# Patient Record
Sex: MALE | Race: WHITE | NOT HISPANIC OR LATINO | URBAN - METROPOLITAN AREA
[De-identification: names, ages, dates, MRNs, and addresses within clinical notes are randomized per-mention and may not be internally consistent; named-entity substitution may affect disease eponyms.]

---

## 2018-10-24 ENCOUNTER — OUTPATIENT (OUTPATIENT)
Dept: OUTPATIENT SERVICES | Facility: HOSPITAL | Age: 28
LOS: 1 days | End: 2018-10-24
Payer: MEDICARE

## 2018-10-24 PROCEDURE — 87177 OVA AND PARASITES SMEARS: CPT

## 2018-10-24 PROCEDURE — 87045 FECES CULTURE AEROBIC BACT: CPT

## 2018-10-24 PROCEDURE — 87324 CLOSTRIDIUM AG IA: CPT

## 2018-10-24 PROCEDURE — 87046 STOOL CULTR AEROBIC BACT EA: CPT

## 2018-10-25 DIAGNOSIS — R19.7 DIARRHEA, UNSPECIFIED: ICD-10-CM

## 2018-10-26 LAB
CULTURE RESULTS: SIGNIFICANT CHANGE UP
CULTURE RESULTS: SIGNIFICANT CHANGE UP
SPECIMEN SOURCE: SIGNIFICANT CHANGE UP
SPECIMEN SOURCE: SIGNIFICANT CHANGE UP

## 2018-11-06 LAB — CULTURE RESULTS: SIGNIFICANT CHANGE UP

## 2021-04-08 ENCOUNTER — EMERGENCY (EMERGENCY)
Facility: HOSPITAL | Age: 31
LOS: 1 days | Discharge: LEFT BEFORE DISCHARGE | End: 2021-04-08
Attending: EMERGENCY MEDICINE | Admitting: EMERGENCY MEDICINE
Payer: MEDICARE

## 2021-04-08 VITALS
HEART RATE: 120 BPM | SYSTOLIC BLOOD PRESSURE: 126 MMHG | OXYGEN SATURATION: 96 % | HEIGHT: 69 IN | DIASTOLIC BLOOD PRESSURE: 72 MMHG | TEMPERATURE: 99 F | RESPIRATION RATE: 20 BRPM | WEIGHT: 195.11 LBS
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
## 2021-04-08 DIAGNOSIS — R07.9 CHEST PAIN, UNSPECIFIED: ICD-10-CM
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
## 2021-04-08 DIAGNOSIS — R07.2 PRECORDIAL PAIN: ICD-10-CM
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
## 2021-04-08 DIAGNOSIS — Z88.0 ALLERGY STATUS TO PENICILLIN: ICD-10-CM

## 2021-04-08 DIAGNOSIS — F41.9 ANXIETY DISORDER, UNSPECIFIED: ICD-10-CM

## 2021-04-08 PROCEDURE — 99285 EMERGENCY DEPT VISIT HI MDM: CPT | Mod: 25

## 2021-04-08 PROCEDURE — 93010 ELECTROCARDIOGRAM REPORT: CPT

## 2021-04-08 NOTE — ED ADULT TRIAGE NOTE - CHIEF COMPLAINT QUOTE
Pt reports mid sternal CP x 3 days, described as sharp and intermittent. Pt reports pain worse with deep breaths. Hx of pericarditis 1 year ago, but states this feels different. Pt denies SOB/dizziness/cough.

## 2021-04-08 NOTE — ED ADULT TRIAGE NOTE - NS ED TRIAGE AVPU SCALE
Alert-The patient is alert, awake and responds to voice. The patient is oriented to time, place, and person. The triage nurse is able to obtain subjective information. Vascular stents/Clips

## 2021-04-09 VITALS — HEART RATE: 114 BPM

## 2021-04-09 LAB
ALBUMIN SERPL ELPH-MCNC: 4.7 G/DL — SIGNIFICANT CHANGE UP (ref 3.3–5)
ALP SERPL-CCNC: 116 U/L — SIGNIFICANT CHANGE UP (ref 40–120)
ALT FLD-CCNC: 23 U/L — SIGNIFICANT CHANGE UP (ref 10–45)
ANION GAP SERPL CALC-SCNC: 13 MMOL/L — SIGNIFICANT CHANGE UP (ref 5–17)
APTT BLD: 37.5 SEC — HIGH (ref 27.5–35.5)
AST SERPL-CCNC: 18 U/L — SIGNIFICANT CHANGE UP (ref 10–40)
BASOPHILS # BLD AUTO: 0.06 K/UL — SIGNIFICANT CHANGE UP (ref 0–0.2)
BASOPHILS NFR BLD AUTO: 0.8 % — SIGNIFICANT CHANGE UP (ref 0–2)
BILIRUB SERPL-MCNC: <0.2 MG/DL — SIGNIFICANT CHANGE UP (ref 0.2–1.2)
BUN SERPL-MCNC: 18 MG/DL — SIGNIFICANT CHANGE UP (ref 7–23)
CALCIUM SERPL-MCNC: 9.3 MG/DL — SIGNIFICANT CHANGE UP (ref 8.4–10.5)
CHLORIDE SERPL-SCNC: 102 MMOL/L — SIGNIFICANT CHANGE UP (ref 96–108)
CO2 SERPL-SCNC: 25 MMOL/L — SIGNIFICANT CHANGE UP (ref 22–31)
CREAT SERPL-MCNC: 0.77 MG/DL — SIGNIFICANT CHANGE UP (ref 0.5–1.3)
EOSINOPHIL # BLD AUTO: 0.24 K/UL — SIGNIFICANT CHANGE UP (ref 0–0.5)
EOSINOPHIL NFR BLD AUTO: 3.2 % — SIGNIFICANT CHANGE UP (ref 0–6)
GLUCOSE SERPL-MCNC: 119 MG/DL — HIGH (ref 70–99)
HCT VFR BLD CALC: 42.3 % — SIGNIFICANT CHANGE UP (ref 39–50)
HGB BLD-MCNC: 14 G/DL — SIGNIFICANT CHANGE UP (ref 13–17)
IMM GRANULOCYTES NFR BLD AUTO: 0.3 % — SIGNIFICANT CHANGE UP (ref 0–1.5)
INR BLD: 1.11 — SIGNIFICANT CHANGE UP (ref 0.88–1.16)
LIDOCAIN IGE QN: 25 U/L — SIGNIFICANT CHANGE UP (ref 7–60)
LYMPHOCYTES # BLD AUTO: 2.06 K/UL — SIGNIFICANT CHANGE UP (ref 1–3.3)
LYMPHOCYTES # BLD AUTO: 27.1 % — SIGNIFICANT CHANGE UP (ref 13–44)
MCHC RBC-ENTMCNC: 29.5 PG — SIGNIFICANT CHANGE UP (ref 27–34)
MCHC RBC-ENTMCNC: 33.1 GM/DL — SIGNIFICANT CHANGE UP (ref 32–36)
MCV RBC AUTO: 89.2 FL — SIGNIFICANT CHANGE UP (ref 80–100)
MONOCYTES # BLD AUTO: 0.52 K/UL — SIGNIFICANT CHANGE UP (ref 0–0.9)
MONOCYTES NFR BLD AUTO: 6.9 % — SIGNIFICANT CHANGE UP (ref 2–14)
NEUTROPHILS # BLD AUTO: 4.69 K/UL — SIGNIFICANT CHANGE UP (ref 1.8–7.4)
NEUTROPHILS NFR BLD AUTO: 61.7 % — SIGNIFICANT CHANGE UP (ref 43–77)
NRBC # BLD: 0 /100 WBCS — SIGNIFICANT CHANGE UP (ref 0–0)
PLATELET # BLD AUTO: 238 K/UL — SIGNIFICANT CHANGE UP (ref 150–400)
POTASSIUM SERPL-MCNC: 4.2 MMOL/L — SIGNIFICANT CHANGE UP (ref 3.5–5.3)
POTASSIUM SERPL-SCNC: 4.2 MMOL/L — SIGNIFICANT CHANGE UP (ref 3.5–5.3)
PROT SERPL-MCNC: 7.3 G/DL — SIGNIFICANT CHANGE UP (ref 6–8.3)
PROTHROM AB SERPL-ACNC: 13.3 SEC — SIGNIFICANT CHANGE UP (ref 10.6–13.6)
RBC # BLD: 4.74 M/UL — SIGNIFICANT CHANGE UP (ref 4.2–5.8)
RBC # FLD: 12 % — SIGNIFICANT CHANGE UP (ref 10.3–14.5)
SODIUM SERPL-SCNC: 140 MMOL/L — SIGNIFICANT CHANGE UP (ref 135–145)
TROPONIN T SERPL-MCNC: 0.01 NG/ML — SIGNIFICANT CHANGE UP (ref 0–0.01)
WBC # BLD: 7.59 K/UL — SIGNIFICANT CHANGE UP (ref 3.8–10.5)
WBC # FLD AUTO: 7.59 K/UL — SIGNIFICANT CHANGE UP (ref 3.8–10.5)

## 2021-04-09 PROCEDURE — 80053 COMPREHEN METABOLIC PANEL: CPT

## 2021-04-09 PROCEDURE — 85730 THROMBOPLASTIN TIME PARTIAL: CPT

## 2021-04-09 PROCEDURE — 84484 ASSAY OF TROPONIN QUANT: CPT

## 2021-04-09 PROCEDURE — 85025 COMPLETE CBC W/AUTO DIFF WBC: CPT

## 2021-04-09 PROCEDURE — 85610 PROTHROMBIN TIME: CPT

## 2021-04-09 PROCEDURE — 93005 ELECTROCARDIOGRAM TRACING: CPT

## 2021-04-09 PROCEDURE — 83690 ASSAY OF LIPASE: CPT

## 2021-04-09 PROCEDURE — 99283 EMERGENCY DEPT VISIT LOW MDM: CPT

## 2021-04-09 PROCEDURE — 36415 COLL VENOUS BLD VENIPUNCTURE: CPT

## 2021-04-09 NOTE — ED PROVIDER NOTE - PATIENT PORTAL LINK FT
You can access the FollowMyHealth Patient Portal offered by [facility] by registering at the following website: [email or web address]. By joining [facility]’s FollowMyHealth portal, you will also be able to view your health information using other applications (apps) compatible with our system.

## 2021-04-09 NOTE — ED ADULT NURSE NOTE - OBJECTIVE STATEMENT
pt is 30y male, here for midsternal non radiating CP x3 days, worsens with deep breaths, not affected by activity, pt reports hx of pericarditis but sx different today, no other medical hx, pt A&Ox3, ambulatory with steady gait, normal heart and lung sounds, HR elevated at 120, no leg swelling, NAD noted

## 2021-04-09 NOTE — ED PROVIDER NOTE - NSFOLLOWUPINSTRUCTIONS_ED_ALL_ED_FT
Chest Pain    WHAT YOU NEED TO KNOW:    Chest pain can be caused by a range of conditions, from not serious to life-threatening. Chest pain can be a symptom of a digestive problem, such as acid reflux or a stomach ulcer. An anxiety attack or a strong emotion, such as anger, can also cause chest pain. Infection, inflammation, or a fracture in the bones or cartilage in your chest can cause pain or discomfort. Sometimes chest pain or pressure is caused by poor blood flow to your heart (angina). Chest pain may also be caused by life-threatening conditions such as a heart attack or blood clot in your lungs.    DISCHARGE INSTRUCTIONS:    Call your local emergency number (911 in the [place]) or have someone call if:   •You have any of the following signs of a heart attack: ?Squeezing, pressure, or pain in your chest      ?You may also have any of the following: ?Discomfort or pain in your back, neck, jaw, stomach, or arm      ?Shortness of breath      ?Nausea or vomiting      ?Lightheadedness or a sudden cold sweat            Return to the emergency department if:   •You have chest discomfort that gets worse, even with medicine.      •You cough or vomit blood.      •Your bowel movements are black or bloody.      •You cannot stop vomiting, or it hurts to swallow.      Call your doctor if:   •You have questions or concerns about your condition or care.          Medicines:   •Medicines may be given to treat the cause of your chest pain. Examples include pain medicine, anxiety medicine, or medicines to increase blood flow to your heart.      •Do not take certain medicines without asking your healthcare provider first. These include NSAIDs, herbal or vitamin supplements, or hormones (estrogen or progestin).      •Take your medicine as directed. Contact your healthcare provider if you think your medicine is not helping or if you have side effects. Tell him or her if you are allergic to any medicine. Keep a list of the medicines, vitamins, and herbs you take. Include the amounts, and when and why you take them. Bring the list or the pill bottles to follow-up visits. Carry your medicine list with you in case of an emergency.      Healthy living tips: The following are general healthy guidelines. If the cause of your chest pain is known, your healthcare provider will give you specific guidelines to follow.  •Do not smoke. Nicotine and other chemicals in cigarettes and cigars can cause lung and heart damage. Ask your healthcare provider for information if you currently smoke and need help to quit. E-cigarettes or smokeless tobacco still contain nicotine. Talk to your healthcare provider before you use these products.      •Choose a variety of healthy foods as often as possible. Include fresh, frozen, or canned fruits and vegetables. Also include low-fat dairy products, fish, chicken (without skin), and lean meats. Your healthcare provider or a dietitian can help you create meal plans. You may need to avoid certain foods or drinks if your pain is caused by a digestion problem.    •Lower your sodium (salt) intake. Limit foods that are high in sodium, such as canned foods, salty snacks, and cold cuts. If you add salt when you cook food, do not add more at the table. Choose low-sodium canned foods as much as possible.      •Drink plenty of water every day. Water helps your body to control your temperature and blood pressure. Ask your healthcare provider how much water you should drink every day.      •Ask about activity. Your healthcare provider will tell you which activities to limit or avoid. Ask when you can drive, return to work, and have sex. Ask about the best exercise plan for you.      •Maintain a healthy weight. Ask your healthcare provider what a healthy weight is for you. Ask him or her to help you create a safe weight loss plan if you are overweight.      •Ask about vaccines you may need. Get the influenza (flu) vaccine every year as soon as recommended, usually in September or October. You may also need a pneumococcal vaccine to prevent pneumonia. The vaccine is usually given every 5 years, starting at age 65. Your healthcare provider can tell you if should get other vaccines, and when to get them.      Follow up with your healthcare provider within 72 hours, or as directed: You may need to return for more tests to find the cause of your chest pain. You may be referred to a specialist, such as a cardiologist or gastroenterologist. Write down your questions so you remember to ask them during your visits.

## 2021-04-09 NOTE — ED PROVIDER NOTE - PROGRESS NOTE DETAILS
pt does not wish to wait for blood results or CXR.  pt demonstrates clear understanding of risk of leaving AMA including but not limited to worsening chest pain, missed diagnosis.  Pt informed of all lab and radiology results, demonstrates understanding of results.  Pt wants to be discharged despite being advised STRONGLY and REPEATEDLY to stay for completion of evaluation. A&Ox3, speaking clearly and coherently, demonstrates decision-making capacity.  Pt understands that the risks of leaving include, but not limited to, death and/or disability; pt accepts these risks.  Pt understands to be seen by PMD in 1-2 days; pt understands to return as soon as possible for persistence or any worsening of symptoms. Pt given opportunity to ask any and all questions.  Pt understands that the ER diagnosis being given today is a preliminary one, and, like many ER diagnoses, is a preliminary impression, often based on limited information, that may change as current Sx's evolve or new Sx's develop. initially pt did not wish to wait for results.  pt signed AMA form, then changed his mind and said he would wait for labs. pt does not wish to do CXR.  would like to be discharged, states will f/u with PMD.  still tachycardic, pt does not wish to do further workup or medication.  no chest pain while resting in stretcher.    I have discussed the discharge plan with the patient. The patient agrees with the plan, as discussed.  The patient understands Emergency Department diagnosis is a preliminary diagnosis often based on limited information and that the patient must adhere to the follow-up plan as discussed.  The patient understands that if the symptoms worsen the patient may return to the Emergency Department at any time for further evaluation and treatment.

## 2021-04-09 NOTE — ED PROVIDER NOTE - CLINICAL SUMMARY MEDICAL DECISION MAKING FREE TEXT BOX
midsternal chest pain for few days, only with deep inspiration or lifting, no SOB, no resp distress, speaking in full sentences, no hypoxia, no tachypnea. tachycardic, however pt states he is feeling very anxious about being in the ED. no PE risk factors. doubt ACS. pt declined to take pain medication, pepcid or medication for anxiety. refused iv placement at this time  -check labs  -cxr  -ekg

## 2021-04-09 NOTE — ED PROVIDER NOTE - OBJECTIVE STATEMENT
30M hx anxiety, c/o midsternal chest pain. pt states pain ongoing for past few days. states occurs with deep inspiration and lifting objects. no SOB. no cough, no fevers, no LE swelling. no recent travel. no sick contacts. pt states he feels very anxious being here in the ED.

## 2021-04-27 PROBLEM — Z00.00 ENCOUNTER FOR PREVENTIVE HEALTH EXAMINATION: Status: ACTIVE | Noted: 2021-04-27

## 2021-04-27 PROBLEM — F41.9 ANXIETY DISORDER, UNSPECIFIED: Chronic | Status: ACTIVE | Noted: 2021-04-09

## 2021-05-05 ENCOUNTER — APPOINTMENT (OUTPATIENT)
Dept: HEART AND VASCULAR | Facility: CLINIC | Age: 31
End: 2021-05-05

## 2021-12-14 ENCOUNTER — EMERGENCY (EMERGENCY)
Facility: HOSPITAL | Age: 31
LOS: 1 days | Discharge: LEFT BEFORE DISCHARGE | End: 2021-12-14
Admitting: EMERGENCY MEDICINE
Payer: MEDICARE

## 2021-12-14 VITALS
OXYGEN SATURATION: 97 % | DIASTOLIC BLOOD PRESSURE: 72 MMHG | SYSTOLIC BLOOD PRESSURE: 131 MMHG | HEART RATE: 119 BPM | TEMPERATURE: 98 F | RESPIRATION RATE: 18 BRPM | HEIGHT: 69 IN

## 2021-12-14 DIAGNOSIS — Z53.21 PROCEDURE AND TREATMENT NOT CARRIED OUT DUE TO PATIENT LEAVING PRIOR TO BEING SEEN BY HEALTH CARE PROVIDER: ICD-10-CM

## 2021-12-14 DIAGNOSIS — K13.79 OTHER LESIONS OF ORAL MUCOSA: ICD-10-CM

## 2021-12-14 PROCEDURE — L9991: CPT

## 2021-12-14 NOTE — ED ADULT TRIAGE NOTE - ARRIVAL INFO ADDITIONAL COMMENTS
pt developed vertical lesions on his upper lip 1-2 weeks ago and when he saw his psychiatrist today she was concerned it could be johann dee's and rec he be seen in ER.  pt denies other symptoms.

## 2022-02-11 NOTE — ED PROVIDER NOTE - TOBACCO USE
[Dear  ___] : Dear  [unfilled], [Consult Letter:] : I had the pleasure of evaluating your patient, [unfilled]. [Please see my note below.] : Please see my note below. [Consult Closing:] : Thank you very much for allowing me to participate in the care of this patient.  If you have any questions, please do not hesitate to contact me. [Sincerely,] : Sincerely, [FreeTextEntry2] : Annette Beharry, DO\par [address], [facility] [address]\par [phone] [FreeTextEntry3] : Heber Lyles MD MPH\par Fellow\par Pediatric Hematology, Oncology and Stem Cell Transplantation\par [facility]\par \par Zoey Espinosa MD, MPH\par Attending Physician\par [facility]\par Hematology /Oncology and Stem Cell Transplantation\par  of Pediatrics\par [employer] [facility] Never smoker

## 2025-06-28 ENCOUNTER — EMERGENCY (EMERGENCY)
Facility: HOSPITAL | Age: 35
LOS: 1 days | End: 2025-06-28
Attending: EMERGENCY MEDICINE | Admitting: EMERGENCY MEDICINE
Payer: MEDICARE

## 2025-06-28 VITALS
TEMPERATURE: 98 F | RESPIRATION RATE: 18 BRPM | HEART RATE: 99 BPM | DIASTOLIC BLOOD PRESSURE: 79 MMHG | OXYGEN SATURATION: 100 % | HEIGHT: 69 IN | WEIGHT: 160.06 LBS | SYSTOLIC BLOOD PRESSURE: 129 MMHG

## 2025-06-28 PROCEDURE — 73630 X-RAY EXAM OF FOOT: CPT

## 2025-06-28 PROCEDURE — 28660 TREAT TOE DISLOCATION: CPT | Mod: T3

## 2025-06-28 PROCEDURE — 73630 X-RAY EXAM OF FOOT: CPT | Mod: 26,LT

## 2025-06-28 PROCEDURE — 99285 EMERGENCY DEPT VISIT HI MDM: CPT

## 2025-06-28 PROCEDURE — 99284 EMERGENCY DEPT VISIT MOD MDM: CPT | Mod: 25

## 2025-06-28 NOTE — ED PROVIDER NOTE - NSFOLLOWUPINSTRUCTIONS_ED_ALL_ED_FT
Take tylenol 650mg or motrin 600mg as needed every 6 hours for pain. Wear hard soled show.  REST- Rest your hurting/injured joint or extremity to decrease pain and swelling for 24-48 hours    ICE- Apply ice to area of pain to decreased inflammation and pain, put towel/barrier between ice and skin. You can keep ice on for 20 minutes at a time 4-8 times daily   COMPRESSION- Wear ace wrap or brace for support to reduce swelling.  Make sure not to wrap too tight, loosen if skin feeling numb/tingling or skin turns blue   ELEVATION- Elevate hurting/injured area 6 or more inches about level of heart to decrease swelling/inflammation.  Use pillow under joint to elevate area    Follow up in Podiatry clinic  [address]  [facility] 12th floor  9am Tuesday    1. You were seen for Left 4th toe middle phalanx dislocation   . A copy of any of your resulted labs, imaging, and findings have been provided to you. Make sure to view any test results that may not have yet resulted at the time of your discharge by creating a FollowMyHealth account at: [email or web address] to sign up for FollowMyHealth.   2. Continue to take your home medications as prescribed.   3. Please follow up with your primary care physician. You may call our referrals coordinator at [phone] Monday to Friday 11am-7pm for assistance with making an appointment. Or you can call [phone] to make an appointment.  4. Return to the emergency department for new, persistent, or worsening symptoms or signs, or for any concerning symptoms.   5. For your for health, you should make healthy food choices and be physically active. Also, you should not smoke or use drugs, and you should not drink alcohol in excess. Please visit [email or web address] for resources and more information.

## 2025-06-28 NOTE — ED PROVIDER NOTE - OBJECTIVE STATEMENT
34M no PMH presenting with pain to his Left 4th toe. Pt was walking barefoot in his house yesterday when he accidentally hit his toe against a moulding. Complains of sharp pain 7/10 mostly when walking.

## 2025-06-28 NOTE — ED PROVIDER NOTE - PATIENT PORTAL LINK FT
You can access the FollowMyHealth Patient Portal offered by [facility] by registering at the following website: [email or web address]. By joining [facility]’s FollowMyHealth portal, you will also be able to view your health information using other applications (apps) compatible with our system.

## 2025-06-28 NOTE — CONSULT NOTE ADULT - ASSESSMENT
34 M no PMH presenting with pain to his Left 4th toe. Pt was walking barefoot in his house yesterday when he accidentally hit his toe against a moulding. At time of consult, pt is VSS. Xray wet read shows laterally dislocated Left 4th middle phalanx. Closed reduction of Left 4th digit attempted initially. Pt ultimately decided to stop the closed reduction attempt and will follow up at a private podiatrist office to have the toe closed reduce.     Plan:    -Xrays reviewed.   -10 cc 1% Lidocaine plain injected into Left foot   -Attempted closed reduction of Left 4th digit performed.   -Say splint applied to Left 3rd and 4th digits using coban   -WBAT to Left foot in surgical shoe     Podiatry signing off, plan discussed with attending.     Upon discharge, pt can follow up with private podiatrist of his choosing.     OR    Follow-up at Podiatry Outpatient Clinic with Dr. Jin within 1-2 weeks of discharge.   Address: [address], 12th floor [facility]  Phone: [phone]  Please call for an appointment.

## 2025-06-28 NOTE — CONSULT NOTE ADULT - SUBJECTIVE AND OBJECTIVE BOX
Attending: Dr. Boucher     Patient is a 34y old  Male who presents with a chief complaint of Left 4th toe pain.     HPI:  34 M no PMH presenting with pain to his Left 4th toe. Pt was walking barefoot in his house yesterday when he accidentally hit his toe against a moulding. Complains of sharp pain 7/10 mostly when walking. Does not follow with a podiatrist      Review of systems negative except per HPI and as stated below  General:	 no weakness; no fevers, no chills  Skin/Breast: no rash  Respiratory and Thorax: no SOB, no cough  Cardiovascular:	No chest pain  Gastrointestinal:	 no nausea, vomiting , diarrhea  Genitourinary:	no dysuria, no difficulty urinating, no hematuria  Musculoskeletal:	no weakness, no joint swelling/pain  Neurological:	no focal weakness/numbness  Endocrine:	no polyuria, no polydipsia    PAST MEDICAL & SURGICAL HISTORY:  Anxiety        Home Medications:    Allergies    penicillin (Unknown)  Seroquel (Hives)    Intolerances      FAMILY HISTORY:    Social History:       LABS              Vital Signs Last 24 Hrs  T(C): 36.7 (28 Jun 2025 20:23), Max: 36.7 (28 Jun 2025 20:23)  T(F): 98.1 (28 Jun 2025 20:23), Max: 98.1 (28 Jun 2025 20:23)  HR: 99 (28 Jun 2025 20:23) (99 - 99)  BP: 129/79 (28 Jun 2025 20:23) (129/79 - 129/79)  BP(mean): --  RR: 18 (28 Jun 2025 20:23) (18 - 18)  SpO2: 100% (28 Jun 2025 20:23) (100% - 100%)    Parameters below as of 28 Jun 2025 20:23  Patient On (Oxygen Delivery Method): room air        PHYSICAL EXAM  General: NAD, AA0x3    Left Lower Extremity Focused:  Vasc: DP, PT 2/4 B/L. TG warm to cool. Edema to Left 4th digit  Derm: Bruising and ecchymosis to Left 4th digit. No erythema. No open wounds. Diffuse skin xerosis to dorsal digits and plantar foot.  Neuro: protective sensation intact  MSK: +TTP to Left 4th digit      RADIOLOGY  Xray wet read: laterally dislocated Left 4th middle phalanx                      Attending: Dr. Boucher     Patient is a 34y old  Male who presents with a chief complaint of Left 4th toe pain.     HPI:  34 M no PMH presenting with pain to his Left 4th toe. Pt was walking barefoot in his house yesterday when he accidentally hit his toe against a moulding. Complains of sharp pain 7/10 mostly when walking. Does not follow with a podiatrist      Review of systems negative except per HPI and as stated below  General:	 no weakness; no fevers, no chills  Skin/Breast: no rash  Respiratory and Thorax: no SOB, no cough  Cardiovascular:	No chest pain  Gastrointestinal:	 no nausea, vomiting , diarrhea  Genitourinary:	no dysuria, no difficulty urinating, no hematuria  Musculoskeletal:	no weakness, no joint swelling/pain  Neurological:	no focal weakness/numbness  Endocrine:	no polyuria, no polydipsia    PAST MEDICAL & SURGICAL HISTORY:  Anxiety        Home Medications:    Allergies    penicillin (Unknown)  Seroquel (Hives)    Intolerances      FAMILY HISTORY:    Social History:       LABS              Vital Signs Last 24 Hrs  T(C): 36.7 (28 Jun 2025 20:23), Max: 36.7 (28 Jun 2025 20:23)  T(F): 98.1 (28 Jun 2025 20:23), Max: 98.1 (28 Jun 2025 20:23)  HR: 99 (28 Jun 2025 20:23) (99 - 99)  BP: 129/79 (28 Jun 2025 20:23) (129/79 - 129/79)  BP(mean): --  RR: 18 (28 Jun 2025 20:23) (18 - 18)  SpO2: 100% (28 Jun 2025 20:23) (100% - 100%)    Parameters below as of 28 Jun 2025 20:23  Patient On (Oxygen Delivery Method): room air        PHYSICAL EXAM  General: NAD, AA0x3    Left Lower Extremity Focused:  Vasc: DP, PT 2/4 B/L. TG warm to cool. Edema to Left 4th digit  Derm: Bruising and ecchymosis to Left 4th digit. No erythema. No open wounds. Diffuse skin xerosis to dorsal digits and plantar foot.  Neuro: protective sensation intact  MSK: +TTP to Left 4th digit      RADIOLOGY  Xray wet read: laterally dislocated Left 4th middle phalanx   Post reduction xray wet read: Left 4th middle phalanx still laterally displaced

## 2025-06-28 NOTE — ED PROVIDER NOTE - CLINICAL SUMMARY MEDICAL DECISION MAKING FREE TEXT BOX
34M no PMH presenting with pain to his Left 4th toe. Pt was walking barefoot in his house yesterday when he accidentally hit his toe against a moulding. Complains of sharp pain 7/10 mostly when walking.    Pt declines pain meds, ecchymosis and TTP left toe.   XR shows L 4th middle phalanx dislocation  Podiatry consulted and attempted dislocation.   XR shows still dislocated.   Pt does not want additional attempts.   Advised to f/u with podiatry clinic.   INstructions for RICE given.

## 2025-06-28 NOTE — ED PROVIDER NOTE - PHYSICAL EXAMINATION
GEN: Well appearing, well developed, awake, alert, oriented to person, place, time/situation and in no apparent distress. NTAF  ENT: Airway patent, Nasal mucosa clear. Mouth with normal mucosa.  EYES: Clear bilaterally. PERRL, EOMI  RESPIRATORY: Breathing comfortably with normal RR. N   CARDIAC: Regular rate and rhythm   ABDOMEN: Soft, nontender,   MSK: Left 4th toe ecchymosis and TTP, otherwise Range of motion is not limited, no deformities noted.  NEURO: Alert and oriented, no focal deficits.  SKIN: Skin normal color for race, warm, dry and intact. Dry skin.  PSYCH: Alert and oriented to person, place, time/situation. normal mood and affect. no apparent risk to self or others.
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
## 2025-06-30 DIAGNOSIS — Z88.8 ALLERGY STATUS TO OTHER DRUGS, MEDICAMENTS AND BIOLOGICAL SUBSTANCES: ICD-10-CM
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
## 2025-06-30 DIAGNOSIS — S93.115A DISLOCATION OF INTERPHALANGEAL JOINT OF LEFT LESSER TOE(S), INITIAL ENCOUNTER: ICD-10-CM

## 2025-06-30 DIAGNOSIS — Z88.0 ALLERGY STATUS TO PENICILLIN: ICD-10-CM
